# Patient Record
Sex: FEMALE | Race: BLACK OR AFRICAN AMERICAN
[De-identification: names, ages, dates, MRNs, and addresses within clinical notes are randomized per-mention and may not be internally consistent; named-entity substitution may affect disease eponyms.]

---

## 2019-02-18 ENCOUNTER — HOSPITAL ENCOUNTER (EMERGENCY)
Dept: HOSPITAL 53 - M ED | Age: 29
Discharge: HOME | End: 2019-02-18
Payer: COMMERCIAL

## 2019-02-18 VITALS — BODY MASS INDEX: 28.22 KG/M2 | HEIGHT: 65 IN | WEIGHT: 169.36 LBS

## 2019-02-18 VITALS — SYSTOLIC BLOOD PRESSURE: 121 MMHG | DIASTOLIC BLOOD PRESSURE: 75 MMHG

## 2019-02-18 DIAGNOSIS — J45.909: ICD-10-CM

## 2019-02-18 DIAGNOSIS — J02.9: Primary | ICD-10-CM

## 2019-02-25 ENCOUNTER — HOSPITAL ENCOUNTER (EMERGENCY)
Dept: HOSPITAL 53 - M ED | Age: 29
Discharge: HOME | End: 2019-02-25
Payer: COMMERCIAL

## 2019-02-25 VITALS — SYSTOLIC BLOOD PRESSURE: 120 MMHG | DIASTOLIC BLOOD PRESSURE: 77 MMHG

## 2019-02-25 VITALS — BODY MASS INDEX: 27.38 KG/M2 | HEIGHT: 66 IN | WEIGHT: 170.35 LBS

## 2019-02-25 DIAGNOSIS — Z3A.01: ICD-10-CM

## 2019-02-25 DIAGNOSIS — Z97.5: ICD-10-CM

## 2019-02-25 DIAGNOSIS — O20.8: Primary | ICD-10-CM

## 2019-02-25 LAB
B-HCG SERPL-ACNC: (no result) MIU/ML
BASOPHILS # BLD AUTO: 0 10^3/UL (ref 0–0.2)
BASOPHILS NFR BLD AUTO: 0.4 % (ref 0–1)
BUN SERPL-MCNC: 6 MG/DL (ref 7–18)
CALCIUM SERPL-MCNC: 9 MG/DL (ref 8.5–10.1)
CHLORIDE SERPL-SCNC: 102 MEQ/L (ref 98–107)
CO2 SERPL-SCNC: 27 MEQ/L (ref 21–32)
CREAT SERPL-MCNC: 0.67 MG/DL (ref 0.55–1.3)
EOSINOPHIL # BLD AUTO: 0.1 10^3/UL (ref 0–0.5)
EOSINOPHIL NFR BLD AUTO: 0.9 % (ref 0–3)
GFR SERPL CREATININE-BSD FRML MDRD: > 60 ML/MIN/{1.73_M2} (ref 60–?)
GLUCOSE SERPL-MCNC: 95 MG/DL (ref 70–100)
HCT VFR BLD AUTO: 42.9 % (ref 36–47)
HGB BLD-MCNC: 13.5 G/DL (ref 12–15.5)
LYMPHOCYTES # BLD AUTO: 2.8 10^3/UL (ref 1.5–4.5)
LYMPHOCYTES NFR BLD AUTO: 28.8 % (ref 24–44)
MCH RBC QN AUTO: 22.7 PG (ref 27–33)
MCHC RBC AUTO-ENTMCNC: 31.5 G/DL (ref 32–36.5)
MCV RBC AUTO: 72.1 FL (ref 80–96)
MONOCYTES # BLD AUTO: 0.8 10^3/UL (ref 0–0.8)
MONOCYTES NFR BLD AUTO: 8 % (ref 0–5)
NEUTROPHILS # BLD AUTO: 6 10^3/UL (ref 1.8–7.7)
NEUTROPHILS NFR BLD AUTO: 61.6 % (ref 36–66)
PLATELET # BLD AUTO: 290 10^3/UL (ref 150–450)
POTASSIUM SERPL-SCNC: 3.6 MEQ/L (ref 3.5–5.1)
RBC # BLD AUTO: 5.95 10^6/UL (ref 4–5.4)
SODIUM SERPL-SCNC: 135 MEQ/L (ref 136–145)
WBC # BLD AUTO: 9.8 10^3/UL (ref 4–10)

## 2019-02-25 NOTE — REP
Clinical:  Vaginal bleeding.

 

Technique:  Transabdominal first trimester obstetrical ultrasound with color

Doppler evaluation.

 

Findings:

Single live early intrauterine pregnancy is appreciated.  Gestational sac with

yolk sac and fetal pole identified.  Crown-rump length  of 11 mm corresponds to 7

weeks 1 day  gestational age with estimated date of delivery 10/13/2019.  Fetal

heart rate equals 141 beats per minute.  A small subchorionic hemorrhage is

identified measuring 19 x 11 x 12 mm.

 

All anterior subserosal fibroid measures 3.2 cm maximal diameter.  IUD is

identified superior and lateral to the gestational sac.  Left maternal ovary

appears normal and measures 2.3 x 1.6 x 2.5 cm.  Right ovary measures 3.9 x 2.3 x

2.0 cm and includes 1.7 cm corpus luteal cyst.

 

 

 

Impression:

1.  Single live early intrauterine pregnancy at 7 weeks 1 day gestational age.

Complete anatomical assessment should be performed and 19-20 weeks.

2.  Small subchorionic hemorrhage.

3.  IUD identified superolateral to the gestational sac.

4.  3.2 cm anterior subserosal fibroid.

 

 

Electronically Signed by

Gonzalo Hayes MD 02/25/2019 02:58 P

## 2019-09-26 ENCOUNTER — HOSPITAL ENCOUNTER (INPATIENT)
Dept: HOSPITAL 5 - TRG | Age: 29
LOS: 2 days | Discharge: HOME | End: 2019-09-28
Attending: OBSTETRICS & GYNECOLOGY | Admitting: OBSTETRICS & GYNECOLOGY
Payer: COMMERCIAL

## 2019-09-26 DIAGNOSIS — J45.909: ICD-10-CM

## 2019-09-26 DIAGNOSIS — D56.3: ICD-10-CM

## 2019-09-26 DIAGNOSIS — O42.92: ICD-10-CM

## 2019-09-26 DIAGNOSIS — Z3A.37: ICD-10-CM

## 2019-09-26 LAB
HCT VFR BLD CALC: 33.2 % (ref 30.3–42.9)
HCT VFR BLD CALC: 38.6 % (ref 30.3–42.9)
HGB BLD-MCNC: 10.5 GM/DL (ref 10.1–14.3)
HGB BLD-MCNC: 12.7 GM/DL (ref 10.1–14.3)
MCHC RBC AUTO-ENTMCNC: 33 % (ref 30–34)
MCV RBC AUTO: 72 FL (ref 79–97)
PLATELET # BLD: 193 K/MM3 (ref 140–440)
RBC # BLD AUTO: 5.4 M/MM3 (ref 3.65–5.03)

## 2019-09-26 PROCEDURE — 85018 HEMOGLOBIN: CPT

## 2019-09-26 PROCEDURE — 00HU33Z INSERTION OF INFUSION DEVICE INTO SPINAL CANAL, PERCUTANEOUS APPROACH: ICD-10-PCS | Performed by: OBSTETRICS & GYNECOLOGY

## 2019-09-26 PROCEDURE — 86901 BLOOD TYPING SEROLOGIC RH(D): CPT

## 2019-09-26 PROCEDURE — 3E0R3BZ INTRODUCTION OF ANESTHETIC AGENT INTO SPINAL CANAL, PERCUTANEOUS APPROACH: ICD-10-PCS

## 2019-09-26 PROCEDURE — 85014 HEMATOCRIT: CPT

## 2019-09-26 PROCEDURE — 86850 RBC ANTIBODY SCREEN: CPT

## 2019-09-26 PROCEDURE — 86592 SYPHILIS TEST NON-TREP QUAL: CPT

## 2019-09-26 PROCEDURE — 85027 COMPLETE CBC AUTOMATED: CPT

## 2019-09-26 PROCEDURE — A6250 SKIN SEAL PROTECT MOISTURIZR: HCPCS

## 2019-09-26 PROCEDURE — 36415 COLL VENOUS BLD VENIPUNCTURE: CPT

## 2019-09-26 PROCEDURE — 86900 BLOOD TYPING SEROLOGIC ABO: CPT

## 2019-09-26 RX ADMIN — FERROUS SULFATE TAB 325 MG (65 MG ELEMENTAL FE) SCH MG: 325 (65 FE) TAB at 11:27

## 2019-09-26 RX ADMIN — Medication PRN APPLIC: at 11:26

## 2019-09-26 RX ADMIN — FERROUS SULFATE TAB 325 MG (65 MG ELEMENTAL FE) SCH MG: 325 (65 FE) TAB at 21:43

## 2019-09-26 RX ADMIN — SODIUM CHLORIDE, SODIUM LACTATE, POTASSIUM CHLORIDE, AND CALCIUM CHLORIDE SCH MLS/HR: .6; .31; .03; .02 INJECTION, SOLUTION INTRAVENOUS at 04:31

## 2019-09-26 RX ADMIN — IBUPROFEN SCH MG: 600 TABLET, FILM COATED ORAL at 14:02

## 2019-09-26 RX ADMIN — IBUPROFEN SCH MG: 600 TABLET, FILM COATED ORAL at 19:14

## 2019-09-26 RX ADMIN — SODIUM CHLORIDE, SODIUM LACTATE, POTASSIUM CHLORIDE, AND CALCIUM CHLORIDE SCH MLS/HR: .6; .31; .03; .02 INJECTION, SOLUTION INTRAVENOUS at 03:45

## 2019-09-26 NOTE — HISTORY AND PHYSICAL REPORT
History of Present Illness


Date of examination: 19


Date of admission: 


19 03:13





Chief complaint: 





contractions, water broke 


History of present illness: 





Pt is a 29 year old female  KENRICK 10/11/19 at 37w6d who presents with 

regular painful contractions and water leaking since 0049 am. She has had 

prenatal care at [facility] since transfer into care at 18 wks 

complicated by beta thalassemia minor and GBS positive status. 





Past History


Past Medical History: hematologic disorders (Beta thalassemia minor )


Past Surgical History: GYN/uterine surgery, D&C, other (lsik, wisdom tooth 

removed )


Family/Genetic History: none


Social history: no significant social history





- Obstetrical History


Expected Date of Delivery: 10/11/19


Actual Gestation: 37 Week(s) 6 Day(s) 


: 3


Para: 1


Hx # Term Pregnancies: 1


Number of  Pregnancies: 0


Spontaneous Abortions: 0


Induced : 1


Number of Living Children: 1





Medications and Allergies


                                    Allergies











Allergy/AdvReac Type Severity Reaction Status Date / Time


 


No Known Drug Allergies Allergy  Unknown Verified 19 03:33











Active Meds: 


Active Medications





Butorphanol Tartrate (Stadol)  2 mg IV Q2H PRN


   PRN Reason: Pain , Severe (7-10)


Ephedrine Sulfate (Ephedrine Sulfate)  10 mg IV Q2M PRN


   PRN Reason: Hypotension


Ephedrine Sulfate (Ephedrine Sulfate)  10 mg IV Q2M PRN


   PRN Reason: Hypotension


Fentanyl (Sublimaze)  100 mcg IV Q2H PRN


   PRN Reason: Labor Pain


Oxytocin/Sodium Chloride (Pitocin/Ns 20 Unit/1000ml Drip)  20 units in 1,000 mls

@ 125 mls/hr IV AS DIRECT TRACE


Oxytocin/Sodium Chloride (Pitocin/Ns 30 Unit/500ml)  30 units in 500 mls @ 2 

mls/hr IV TITR TRACE; Protocol


Lactated Ringer's (Lactated Ringers)  1,000 mls @ 125 mls/hr IV AS DIRECT TRACE


   Last Admin: 19 04:31 Dose:  125 mls/hr


   Documented by: 


Ampicillin Sodium (Ampicillin/Ns 1 Gm/50 Ml)  1 gm in 50 mls @ 100 mls/hr IV 

Q4HR TRACE; Protocol


Fentanyl/Bupivacaine/Sodium Chlor (Fentanyl-Bupiv 2 Mcg/Ml-0.125%)  200 mcg in 

100 mls @ 12 mls/hr EPIDURAL TITR TRACE; Protocol


   Last Admin: 19 04:55 Dose:  12 mls/hr


   Documented by: 


Mineral Oil (Mineral Oil)  30 ml PO QHS PRN


   PRN Reason: Constipation


Nalbuphine HCl (Nubain)  10 mg IV Q2H PRN


   PRN Reason: Pain, Moderate (4-6)


Naloxone HCl (Narcan 0.4 Mg/1 Ml)  0.1 mg IV Q2MIN PRN


   PRN Reason: Res Rate </= 8 or 02 SAT < 92%


Naloxone HCl (Narcan 2 Mg/2 Ml)  0.2 mg IV Q5M PRN


   PRN Reason: Respiratory sedation


Ondansetron HCl (Zofran)  4 mg IV Q8H PRN


   PRN Reason: Nausea And Vomiting


Terbutaline Sulfate (Brethine)  0.25 mg SUB-Q ONCE PRN


   PRN Reason: Hyperstimulation/Hypertonicity


Terbutaline Sulfate (Brethine)  0.25 mg IVP ONCE PRN


   PRN Reason: Hyperstimulation/Hypertonicity











Review of Systems


All systems: negative





- Vital Signs


Vital signs: 


                                   Vital Signs











Resp


 


 18 


 


 19 02:30








                                        











Temp Pulse Resp BP Pulse Ox


 


    87   18   120/76   100 


 


    19 06:41  19 02:30  19 06:41  19 05:33














- Physical Exam


Breasts: Positive: deferred


Abdomen: Positive: soft (gravid )


Genitourinary (Female): Positive: normal external genitalia


Uterus: Positive: enlarged (gravid )


Extremities: Positive: normal





- Obstetrical


FHR: auscultation normal


Uterine Contraction Monitor Mode: External


Cervical Dilatation: 9


Cervical Effacement Percentage: 100


Fetal station: -1


Uterine Contraction Pattern: Regular


Uterine Contraction Intensity: Strong/Firm





Results


Result Diagrams: 


                                 19 02:20





                              Abnormal lab results











  19 Range/Units





  02:20 


 


RBC  5.40 H  (3.65-5.03)  M/mm3


 


MCV  72 L  (79-97)  fl


 


MCH  24 L  (28-32)  pg








All other labs normal.








Assessment and Plan





A: IUP at 37w6d


    Active labor


    SROM 


    GBS positive


    Beta thalassemia minor 





P: Admit to labor and delivery


    Routine intrapartum care 


    Anticipate vaginal delivery

## 2019-09-26 NOTE — PROCEDURE NOTE
OB Delivery Note





- Delivery


Date of Delivery: 19


Surgeon: LASHELL DOMINGUEZ


Estimated blood loss: other (400 mL)





- Vaginal


Delivery presentation: vertex


Delivery position: OA


Intrapartum events: PROM->1hr before delivery


Delivery induction: none


Delivery monitor: external FHT, external uterine


Route of delivery: 


Delivery placenta: spontaneous


Episiotomy: none


Delivery laceration: none


Anesthesia: epidural





- Infant


  ** A


Apgar at 1 minute: 8


Apgar at 5 minutes: 9


Infant Gender: Female (3218g (7lb 2oz) @ 0559 am)

## 2019-09-26 NOTE — ANESTHESIA CONSULTATION
Anesthesia Consult and Med Hx


Date of service: 09/26/19





- Airway


Anesthetic Teeth Evaluation: Good


ROM Head & Neck: Adequate


Mental/Hyoid Distance: Adequate


Mallampati Class: Class II


Intubation Access Assessment: Probably Good





- Pulmonary Exam


CTA: Yes





- Cardiac Exam


Cardiac Exam: RRR





- Pre-Operative Health Status


ASA Pre-Surgery Classification: ASA2


Proposed Anesthetic Plan: Epidural





- Pulmonary


Hx Asthma: Yes (last attack was years ago)


COPD: No


Hx Pneumonia: No





- Central Nervous System


Hx Seizures: No


Hx Psychiatric Problems: No





- Endocrine


Hx Renal Disease: No


Hx End Stage Renal Disease: No


Hx Hypothyroidism: No


Hx Hyperthyroidism: No





- Hematic


Hx Anemia: No


Hx Sickle Cell Disease: No





- Other Systems


Hx Alcohol Use: No

## 2019-09-27 RX ADMIN — Medication PRN APPLIC: at 16:29

## 2019-09-27 RX ADMIN — IBUPROFEN SCH MG: 600 TABLET, FILM COATED ORAL at 16:27

## 2019-09-27 RX ADMIN — FERROUS SULFATE TAB 325 MG (65 MG ELEMENTAL FE) SCH MG: 325 (65 FE) TAB at 21:40

## 2019-09-27 RX ADMIN — IBUPROFEN SCH: 600 TABLET, FILM COATED ORAL at 10:00

## 2019-09-27 RX ADMIN — FERROUS SULFATE TAB 325 MG (65 MG ELEMENTAL FE) SCH MG: 325 (65 FE) TAB at 12:00

## 2019-09-27 RX ADMIN — IBUPROFEN SCH: 600 TABLET, FILM COATED ORAL at 21:38

## 2019-09-27 NOTE — PROGRESS NOTE
Assessment and Plan





A/P PPD 1 


routine PP care


d/c home tomorrow 





Subjective





- Subjective


Date of service: 19


Principal diagnosis: 


Patient reports: appetite normal, voiding normally, pain well controlled, 

flatus, ambulating normally


[place]: doing well





Objective





- Vital Signs


Latest vital signs: 


                                   Vital Signs











  Temp Pulse Resp BP BP Pulse Ox


 


 19 00:00  98.9 F  74  20   111/63  98


 


 19 17:29  98.7 F  94 H  16  118/64   97


 


 19 08:43  98.3 F  103 H  16  127/71   99








                                Intake and Output











 19





 23:59 07:59 15:59


 


Intake Total 480  


 


Balance 480  


 


Intake:   


 


  Oral 480  


 


Other:   


 


  Total, Intake Amount 480  


 


  # Voids   


 


    Void 2  


 


  # Bowel Movements 0  














- Exam


Breasts: Present: normal


Cardiovascular: Present: Regular rate, Normal S1


Lungs: Present: Clear to auscultation, Normal air movement


Abdomen: Present: normal appearance, soft, normal bowel sounds.  Absent: 

distention, tenderness, guarding


Vulva: both: normal


Uterus: Present: normal, firm, fundal height below umbilicus.  Absent: 

bogginess, tenderness


Extremities: Present: normal


Deep Tendon Reflex Grade: Normal +2


Incision: Present: normal

## 2019-09-27 NOTE — DISCHARGE SUMMARY
Providers





- Providers


Date of Admission: 


19 03:13





Date of discharge: 19


Attending physician: 


LASHELL DOMINGUEZ





Primary care physician: 


LASHELL DOMINGUEZ








Hospitalization


Reason for admission: active labor


Delivery: 


Episiotomy: none


Laceration: none


Incision: normal, dry, intact


Other postpartum procedures: none


Discharge diagnosis: IUP at term delivered


 baby: female


Hospital course: 





unremarkable hospital course 


Condition at discharge: Good


Disposition: DC-01 TO HOME OR SELFCARE





Plan





- Discharge Medications


Prescriptions: 


Ferrous Sulfate [Feosol 325 MG tab] 325 mg PO BID #60 tablet


Ibuprofen [Motrin] 800 mg PO Q8HR PRN #30 tablet


 PRN Reason: Pain, Moderate (4-6)


HYDROcodone/APAP 5-325 [[place] 5/325] 1 each PO Q6HR PRN #20 tablet


 PRN Reason: Pain





- Provider Discharge Summary


Activity: routine, no sex for 6 weeks, no strenuous exercise


Diet: routine


Instructions: routine


Additional instructions: 


[]  Smoking cessation referral if applicable(refer to patient education folder 

for contact #)


[]  Refer to [facility] Booklet








Call your doctor immediately for:


* Fever > 100.5


* Heavy vaginal bleeding ( >1 pad per hour)


* Severe persistent headache


* Shortness of breath


* Reddened, hot, painful area to leg or breast


* Drainage or odor from incision.





* Keep incision clean and dry at all times and follow doctor's instructions 

regarding bathing/showering











- Follow up plan


Follow up: 


LASHELL DOMINGUEZ MD [Primary Care Provider] - 10/17/19

## 2019-09-28 VITALS — SYSTOLIC BLOOD PRESSURE: 114 MMHG | DIASTOLIC BLOOD PRESSURE: 69 MMHG

## 2019-09-28 RX ADMIN — IBUPROFEN SCH: 600 TABLET, FILM COATED ORAL at 05:13

## 2019-09-28 RX ADMIN — FERROUS SULFATE TAB 325 MG (65 MG ELEMENTAL FE) SCH MG: 325 (65 FE) TAB at 10:24

## 2019-09-28 NOTE — EVENT NOTE
Date: 09/28/19





Patient is still hospitalized secondary to increased bilirubin in the infant. 

Patient is without complaints.